# Patient Record
Sex: MALE | Race: WHITE | NOT HISPANIC OR LATINO | Employment: FULL TIME | URBAN - METROPOLITAN AREA
[De-identification: names, ages, dates, MRNs, and addresses within clinical notes are randomized per-mention and may not be internally consistent; named-entity substitution may affect disease eponyms.]

---

## 2017-02-21 ENCOUNTER — OFFICE VISIT (OUTPATIENT)
Dept: SURGERY | Facility: CLINIC | Age: 32
End: 2017-02-21

## 2017-02-21 VITALS — BODY MASS INDEX: 35.7 KG/M2 | HEART RATE: 82 BPM | OXYGEN SATURATION: 97 % | WEIGHT: 241 LBS | HEIGHT: 69 IN

## 2017-02-21 DIAGNOSIS — L05.91 PILONIDAL CYST: Primary | ICD-10-CM

## 2017-02-21 PROCEDURE — 99203 OFFICE O/P NEW LOW 30 MIN: CPT | Performed by: SURGERY

## 2017-02-21 RX ORDER — LEVOFLOXACIN 5 MG/ML
500 INJECTION, SOLUTION INTRAVENOUS ONCE
Status: CANCELLED | OUTPATIENT
Start: 2017-02-21 | End: 2017-02-21

## 2017-02-21 RX ORDER — IBUPROFEN 200 MG
200 TABLET ORAL EVERY 6 HOURS PRN
COMMUNITY
End: 2017-03-14

## 2017-02-21 NOTE — PROGRESS NOTES
General Surgery  Initial Office Visit    CC: Infected cyst over tailbone    HPI: The patient is a pleasant 31 y.o. year-old gentleman who presents today for evaluation of a chronic cyst over his lower back at the superior aspect of his gluteal crease.  He has noticed a small cyst there for the last 4-5 years, and states that approximately 2 weeks ago it became very swollen and erythematous.  He sensed a lot of pressure within soft tissues and presented to [facility] where an incision and drainage was performed.  The day following his incision and drainage, a large amount of purulent fluid evacuated from the cavity after he soaked in the bathtub for many hours.  The drainage has since subsided.  He was prescribed Bactrim for 2 weeks, and just completed a full course of antibiotics yesterday.  He states that before this acute infected episode occurred 2 weeks ago, he has noticed chronic daily drainage that occurs at night.  His mother apparently also has a pilonidal cyst with intermittent episodes of cyst infection.    Past Medical History: None    Past Surgical History: Hatch teeth removal, shoulder abscess incision and drainage    Medications: Bactrim DS twice a day ×14 days completed yesterday    Allergies: Keflex (upset stomach)    Family History: Mother with pilonidal cyst    Social History: , smokes 1/2ppd for 12 years, social alcohol use,     ROS:   Constitutional: Negative for fevers or chills  HENT: Negative for hearing loss or runny nose  Eyes: Negative for vision changes or scleral icterus  Respiratory: Negative for cough or shortness of breath  Cardiovascular: Negative for chest pain or heart palpitations  Gastrointestinal: Negative for abdominal pain, nausea, vomiting, constipation, melena, or hematochezia  Genitourinary: Negative for hematuria or dysuria  Musculoskeletal: Negative for joint pain or back pain  Neurologic: Negative for headaches or dizziness  Psychiatric:  Negative for anxiety or depression  All other systems reviewed and negative    Physical Exam:  Vitals:    02/21/17 0830   Pulse: 82   SpO2: 97%     General: No acute distress, well-nourished & well-developed  HEAD: normocephalic, atraumatic  EYES: normal conjunctiva, sclera anicteric  EARS: grossly normal hearing  NECK: supple, no thyromegaly  CARDIOVASCULAR: regular rate and rhythm  RESPIRATORY: clear to auscultation bilaterally  GASTROINTESTINAL: soft, nontender, non-distended  MUSCULOSKELETAL: normal gait and station. No gross extremity abnormalities  BACK: small palpable cyst at superior aspect of gluteal crease with multiple pores identified in lower gluteal crease over sacrum, minimal erythema, no fluctuance or drainage  PSYCHIATRIC: oriented x3, normal mood and affect      ASSESSMENT & PLAN  Mr. Gonzalez is a 31-year-old gentleman with a chronic pilonidal cyst and recent infection that has since resolved with drainage and antibiotic therapy.  He wishes to have his pilonidal cyst electively removed and I have offered pilonidal cystectomy at his earliest convenience.  We did discuss the risks of the procedure including bleeding, wound infection, and wound dehiscence.  He understands that possible wound dehiscence is avoidable if he can avoid any bending at the waist for about 4 weeks postoperatively.  He understands these risks, and has consented to proceed.  All questions were answered today.    Selina Hernandez MD  General and Endoscopic Surgery  [facility]    [address], [address]  [place], [place], [address]  P: [phone]  F: [phone]

## 2017-03-15 ENCOUNTER — HOSPITAL ENCOUNTER (OUTPATIENT)
Facility: HOSPITAL | Age: 32
Setting detail: HOSPITAL OUTPATIENT SURGERY
Discharge: HOME | End: 2017-03-15
Attending: SURGERY | Admitting: SURGERY

## 2017-03-15 ENCOUNTER — ANESTHESIA (OUTPATIENT)
Dept: PERIOP | Facility: HOSPITAL | Age: 32
End: 2017-03-15

## 2017-03-15 ENCOUNTER — ANESTHESIA EVENT (OUTPATIENT)
Dept: PERIOP | Facility: HOSPITAL | Age: 32
End: 2017-03-15

## 2017-03-15 VITALS
RESPIRATION RATE: 16 BRPM | BODY MASS INDEX: 34.7 KG/M2 | TEMPERATURE: 98.6 F | DIASTOLIC BLOOD PRESSURE: 77 MMHG | OXYGEN SATURATION: 94 % | WEIGHT: 242.38 LBS | HEART RATE: 95 BPM | HEIGHT: 70 IN | SYSTOLIC BLOOD PRESSURE: 120 MMHG

## 2017-03-15 DIAGNOSIS — L05.91 PILONIDAL CYST: ICD-10-CM

## 2017-03-15 LAB
DEPRECATED RDW RBC AUTO: 39.8 FL (ref 37–54)
ERYTHROCYTE [DISTWIDTH] IN BLOOD BY AUTOMATED COUNT: 12.3 % (ref 11.5–14.5)
HCT VFR BLD AUTO: 46.4 % (ref 40.4–52.2)
HGB BLD-MCNC: 16.1 G/DL (ref 13.7–17.6)
MCH RBC QN AUTO: 31 PG (ref 27–32.7)
MCHC RBC AUTO-ENTMCNC: 34.7 G/DL (ref 32.6–36.4)
MCV RBC AUTO: 89.2 FL (ref 79.8–96.2)
PLATELET # BLD AUTO: 260 10*3/MM3 (ref 140–500)
PMV BLD AUTO: 9.8 FL (ref 6–12)
RBC # BLD AUTO: 5.2 10*6/MM3 (ref 4.6–6)
WBC NRBC COR # BLD: 7.5 10*3/MM3 (ref 4.5–10.7)

## 2017-03-15 PROCEDURE — 25010000002 PROPOFOL 10 MG/ML EMULSION: Performed by: ANESTHESIOLOGY

## 2017-03-15 PROCEDURE — 25010000002 ONDANSETRON PER 1 MG: Performed by: ANESTHESIOLOGY

## 2017-03-15 PROCEDURE — 25010000002 NEOSTIGMINE 10 MG/10ML SOLUTION: Performed by: ANESTHESIOLOGY

## 2017-03-15 PROCEDURE — 25010000002 MIDAZOLAM PER 1 MG

## 2017-03-15 PROCEDURE — 11771 EXC PILONIDAL CYST XTNSV: CPT | Performed by: SURGERY

## 2017-03-15 PROCEDURE — 85027 COMPLETE CBC AUTOMATED: CPT | Performed by: SURGERY

## 2017-03-15 PROCEDURE — 25010000002 FENTANYL CITRATE (PF) 100 MCG/2ML SOLUTION: Performed by: ANESTHESIOLOGY

## 2017-03-15 PROCEDURE — 88304 TISSUE EXAM BY PATHOLOGIST: CPT | Performed by: SURGERY

## 2017-03-15 PROCEDURE — 25010000002 LEVOFLOXACIN PER 250 MG: Performed by: SURGERY

## 2017-03-15 RX ORDER — SODIUM CHLORIDE, SODIUM LACTATE, POTASSIUM CHLORIDE, CALCIUM CHLORIDE 600; 310; 30; 20 MG/100ML; MG/100ML; MG/100ML; MG/100ML
9 INJECTION, SOLUTION INTRAVENOUS CONTINUOUS
Status: DISCONTINUED | OUTPATIENT
Start: 2017-03-15 | End: 2017-03-15 | Stop reason: HOSPADM

## 2017-03-15 RX ORDER — FLUMAZENIL 0.1 MG/ML
0.2 INJECTION INTRAVENOUS AS NEEDED
Status: DISCONTINUED | OUTPATIENT
Start: 2017-03-15 | End: 2017-03-15 | Stop reason: HOSPADM

## 2017-03-15 RX ORDER — MIDAZOLAM HYDROCHLORIDE 1 MG/ML
INJECTION INTRAMUSCULAR; INTRAVENOUS
Status: COMPLETED
Start: 2017-03-15 | End: 2017-03-15

## 2017-03-15 RX ORDER — DIPHENHYDRAMINE HYDROCHLORIDE 50 MG/ML
12.5 INJECTION INTRAMUSCULAR; INTRAVENOUS
Status: DISCONTINUED | OUTPATIENT
Start: 2017-03-15 | End: 2017-03-15 | Stop reason: HOSPADM

## 2017-03-15 RX ORDER — HYDROMORPHONE HYDROCHLORIDE 1 MG/ML
0.5 INJECTION, SOLUTION INTRAMUSCULAR; INTRAVENOUS; SUBCUTANEOUS
Status: DISCONTINUED | OUTPATIENT
Start: 2017-03-15 | End: 2017-03-15 | Stop reason: HOSPADM

## 2017-03-15 RX ORDER — FAMOTIDINE 10 MG/ML
INJECTION, SOLUTION INTRAVENOUS
Status: COMPLETED
Start: 2017-03-15 | End: 2017-03-15

## 2017-03-15 RX ORDER — LEVOFLOXACIN 5 MG/ML
500 INJECTION, SOLUTION INTRAVENOUS ONCE
Status: COMPLETED | OUTPATIENT
Start: 2017-03-15 | End: 2017-03-15

## 2017-03-15 RX ORDER — OXYCODONE AND ACETAMINOPHEN 7.5; 325 MG/1; MG/1
1 TABLET ORAL ONCE AS NEEDED
Status: DISCONTINUED | OUTPATIENT
Start: 2017-03-15 | End: 2017-03-15 | Stop reason: HOSPADM

## 2017-03-15 RX ORDER — HYDROCODONE BITARTRATE AND ACETAMINOPHEN 7.5; 325 MG/1; MG/1
1 TABLET ORAL ONCE AS NEEDED
Status: COMPLETED | OUTPATIENT
Start: 2017-03-15 | End: 2017-03-15

## 2017-03-15 RX ORDER — LIDOCAINE HYDROCHLORIDE 20 MG/ML
INJECTION, SOLUTION INFILTRATION; PERINEURAL AS NEEDED
Status: DISCONTINUED | OUTPATIENT
Start: 2017-03-15 | End: 2017-03-15 | Stop reason: SURG

## 2017-03-15 RX ORDER — IBUPROFEN 400 MG/1
400 TABLET ORAL EVERY 8 HOURS PRN
COMMUNITY

## 2017-03-15 RX ORDER — HYDRALAZINE HYDROCHLORIDE 20 MG/ML
5 INJECTION INTRAMUSCULAR; INTRAVENOUS
Status: DISCONTINUED | OUTPATIENT
Start: 2017-03-15 | End: 2017-03-15 | Stop reason: HOSPADM

## 2017-03-15 RX ORDER — ONDANSETRON 2 MG/ML
INJECTION INTRAMUSCULAR; INTRAVENOUS AS NEEDED
Status: DISCONTINUED | OUTPATIENT
Start: 2017-03-15 | End: 2017-03-15 | Stop reason: SURG

## 2017-03-15 RX ORDER — LABETALOL HYDROCHLORIDE 5 MG/ML
5 INJECTION, SOLUTION INTRAVENOUS
Status: DISCONTINUED | OUTPATIENT
Start: 2017-03-15 | End: 2017-03-15 | Stop reason: HOSPADM

## 2017-03-15 RX ORDER — FENTANYL CITRATE 50 UG/ML
INJECTION, SOLUTION INTRAMUSCULAR; INTRAVENOUS AS NEEDED
Status: DISCONTINUED | OUTPATIENT
Start: 2017-03-15 | End: 2017-03-15 | Stop reason: SURG

## 2017-03-15 RX ORDER — LIDOCAINE HYDROCHLORIDE 10 MG/ML
1 INJECTION, SOLUTION EPIDURAL; INFILTRATION; INTRACAUDAL; PERINEURAL ONCE
Status: DISCONTINUED | OUTPATIENT
Start: 2017-03-15 | End: 2017-03-15 | Stop reason: HOSPADM

## 2017-03-15 RX ORDER — PROMETHAZINE HYDROCHLORIDE 25 MG/1
25 TABLET ORAL ONCE AS NEEDED
Status: DISCONTINUED | OUTPATIENT
Start: 2017-03-15 | End: 2017-03-15 | Stop reason: HOSPADM

## 2017-03-15 RX ORDER — BUPIVACAINE HYDROCHLORIDE AND EPINEPHRINE 5; 5 MG/ML; UG/ML
INJECTION, SOLUTION PERINEURAL AS NEEDED
Status: DISCONTINUED | OUTPATIENT
Start: 2017-03-15 | End: 2017-03-15 | Stop reason: HOSPADM

## 2017-03-15 RX ORDER — NEOSTIGMINE METHYLSULFATE 1 MG/ML
INJECTION, SOLUTION INTRAVENOUS AS NEEDED
Status: DISCONTINUED | OUTPATIENT
Start: 2017-03-15 | End: 2017-03-15 | Stop reason: SURG

## 2017-03-15 RX ORDER — SODIUM CHLORIDE 0.9 % (FLUSH) 0.9 %
1-10 SYRINGE (ML) INJECTION AS NEEDED
Status: DISCONTINUED | OUTPATIENT
Start: 2017-03-15 | End: 2017-03-15 | Stop reason: HOSPADM

## 2017-03-15 RX ORDER — ROCURONIUM BROMIDE 10 MG/ML
INJECTION, SOLUTION INTRAVENOUS AS NEEDED
Status: DISCONTINUED | OUTPATIENT
Start: 2017-03-15 | End: 2017-03-15 | Stop reason: SURG

## 2017-03-15 RX ORDER — ONDANSETRON 2 MG/ML
4 INJECTION INTRAMUSCULAR; INTRAVENOUS ONCE AS NEEDED
Status: COMPLETED | OUTPATIENT
Start: 2017-03-15 | End: 2017-03-15

## 2017-03-15 RX ORDER — PROMETHAZINE HYDROCHLORIDE 25 MG/1
12.5 TABLET ORAL ONCE AS NEEDED
Status: DISCONTINUED | OUTPATIENT
Start: 2017-03-15 | End: 2017-03-15 | Stop reason: HOSPADM

## 2017-03-15 RX ORDER — HYDROCODONE BITARTRATE AND ACETAMINOPHEN 7.5; 325 MG/1; MG/1
TABLET ORAL
Status: COMPLETED
Start: 2017-03-15 | End: 2017-03-15

## 2017-03-15 RX ORDER — NALOXONE HCL 0.4 MG/ML
0.2 VIAL (ML) INJECTION AS NEEDED
Status: DISCONTINUED | OUTPATIENT
Start: 2017-03-15 | End: 2017-03-15 | Stop reason: HOSPADM

## 2017-03-15 RX ORDER — MIDAZOLAM HYDROCHLORIDE 1 MG/ML
2 INJECTION INTRAMUSCULAR; INTRAVENOUS
Status: DISCONTINUED | OUTPATIENT
Start: 2017-03-15 | End: 2017-03-15 | Stop reason: HOSPADM

## 2017-03-15 RX ORDER — PROMETHAZINE HYDROCHLORIDE 25 MG/ML
12.5 INJECTION, SOLUTION INTRAMUSCULAR; INTRAVENOUS ONCE AS NEEDED
Status: DISCONTINUED | OUTPATIENT
Start: 2017-03-15 | End: 2017-03-15 | Stop reason: HOSPADM

## 2017-03-15 RX ORDER — PROPOFOL 10 MG/ML
VIAL (ML) INTRAVENOUS AS NEEDED
Status: DISCONTINUED | OUTPATIENT
Start: 2017-03-15 | End: 2017-03-15 | Stop reason: SURG

## 2017-03-15 RX ORDER — PROMETHAZINE HYDROCHLORIDE 25 MG/1
25 SUPPOSITORY RECTAL ONCE AS NEEDED
Status: DISCONTINUED | OUTPATIENT
Start: 2017-03-15 | End: 2017-03-15 | Stop reason: HOSPADM

## 2017-03-15 RX ORDER — ONDANSETRON 4 MG/1
4 TABLET, FILM COATED ORAL EVERY 6 HOURS PRN
Qty: 30 TABLET | Refills: 1 | Status: SHIPPED | OUTPATIENT
Start: 2017-03-15 | End: 2017-03-30

## 2017-03-15 RX ORDER — OXYCODONE AND ACETAMINOPHEN 10; 325 MG/1; MG/1
1 TABLET ORAL EVERY 4 HOURS PRN
Qty: 50 TABLET | Refills: 0 | Status: SHIPPED | OUTPATIENT
Start: 2017-03-15 | End: 2017-03-30

## 2017-03-15 RX ORDER — FAMOTIDINE 10 MG/ML
20 INJECTION, SOLUTION INTRAVENOUS ONCE
Status: COMPLETED | OUTPATIENT
Start: 2017-03-15 | End: 2017-03-15

## 2017-03-15 RX ORDER — FENTANYL CITRATE 50 UG/ML
50 INJECTION, SOLUTION INTRAMUSCULAR; INTRAVENOUS
Status: DISCONTINUED | OUTPATIENT
Start: 2017-03-15 | End: 2017-03-15 | Stop reason: HOSPADM

## 2017-03-15 RX ORDER — MIDAZOLAM HYDROCHLORIDE 1 MG/ML
1 INJECTION INTRAMUSCULAR; INTRAVENOUS
Status: DISCONTINUED | OUTPATIENT
Start: 2017-03-15 | End: 2017-03-15 | Stop reason: HOSPADM

## 2017-03-15 RX ORDER — GLYCOPYRROLATE 0.2 MG/ML
INJECTION INTRAMUSCULAR; INTRAVENOUS AS NEEDED
Status: DISCONTINUED | OUTPATIENT
Start: 2017-03-15 | End: 2017-03-15 | Stop reason: SURG

## 2017-03-15 RX ADMIN — LIDOCAINE HYDROCHLORIDE 60 MG: 20 INJECTION, SOLUTION INFILTRATION; PERINEURAL at 10:28

## 2017-03-15 RX ADMIN — GLYCOPYRROLATE 0.2 MG: 0.2 INJECTION INTRAMUSCULAR; INTRAVENOUS at 10:55

## 2017-03-15 RX ADMIN — FENTANYL CITRATE 100 MCG: 50 INJECTION, SOLUTION INTRAMUSCULAR; INTRAVENOUS at 10:28

## 2017-03-15 RX ADMIN — SODIUM CHLORIDE, POTASSIUM CHLORIDE, SODIUM LACTATE AND CALCIUM CHLORIDE 9 ML/HR: 600; 310; 30; 20 INJECTION, SOLUTION INTRAVENOUS at 09:30

## 2017-03-15 RX ADMIN — PROPOFOL 200 MG: 10 INJECTION, EMULSION INTRAVENOUS at 10:28

## 2017-03-15 RX ADMIN — HYDROCODONE BITARTRATE AND ACETAMINOPHEN 1 TABLET: 7.5; 325 TABLET ORAL at 12:01

## 2017-03-15 RX ADMIN — MIDAZOLAM 2 MG: 1 INJECTION INTRAMUSCULAR; INTRAVENOUS at 09:30

## 2017-03-15 RX ADMIN — FAMOTIDINE 20 MG: 10 INJECTION, SOLUTION INTRAVENOUS at 09:29

## 2017-03-15 RX ADMIN — FENTANYL CITRATE 50 MCG: 50 INJECTION, SOLUTION INTRAMUSCULAR; INTRAVENOUS at 11:00

## 2017-03-15 RX ADMIN — NEOSTIGMINE METHYLSULFATE 2.5 MG: 1 INJECTION INTRAVENOUS at 11:22

## 2017-03-15 RX ADMIN — ROCURONIUM BROMIDE 30 MG: 10 INJECTION INTRAVENOUS at 10:28

## 2017-03-15 RX ADMIN — GLYCOPYRROLATE 0.5 MG: 0.2 INJECTION INTRAMUSCULAR; INTRAVENOUS at 11:22

## 2017-03-15 RX ADMIN — FENTANYL CITRATE 100 MCG: 50 INJECTION, SOLUTION INTRAMUSCULAR; INTRAVENOUS at 10:55

## 2017-03-15 RX ADMIN — MIDAZOLAM HYDROCHLORIDE 2 MG: 1 INJECTION INTRAMUSCULAR; INTRAVENOUS at 09:30

## 2017-03-15 RX ADMIN — LEVOFLOXACIN 500 MG: 500 INJECTION, SOLUTION INTRAVENOUS at 09:30

## 2017-03-15 RX ADMIN — ONDANSETRON 4 MG: 2 INJECTION INTRAMUSCULAR; INTRAVENOUS at 12:14

## 2017-03-15 RX ADMIN — ONDANSETRON 4 MG: 2 INJECTION INTRAMUSCULAR; INTRAVENOUS at 10:55

## 2017-03-15 NOTE — OP NOTE
Operative Note :  Selina Hernandez MD      Bruce Kilpatrickpton  1985    Procedure Date: 03/15/17    Pre-op Diagnosis:  · Pilonidal cyst    Post-op Diagnosis:  · Pilonidal cyst    Procedure:   · Pilonidal cystectomy with primary closure    Surgeon: Selina Hernandez MD    Assistant: Alisha WORTHINGTON    Anesthesia:  General (general endotracheal tube)    Estimated Blood Loss: 10 cc    Specimens: Pilonidal cyst    Complications: None    Indications:  · The patient is a 32-year-old gentleman who has had a chronic pilonidal cyst for many months now that has persistently become infected requiring incision and drainage.  He presents today for elective pilonidal cystectomy and understands the risks of the procedure to include bleeding, wound infection, postoperative seroma formation, and possible wound dehiscence.  Despite these risks, he has consented to proceed.    Findings:   · Moderate sized pilonidal cyst at the superior gluteal cleft with multiple pores inferior to this requiring approximately 15 cm length pilonidal cystectomy with primary closure    Description of procedure:  The patient was brought to the operating room and placed on the OR table in supine position.  An endotracheal tube was inserted, and general anesthesia was induced.  He was then repositioned prone with all flexor points and pressure points padded with pillows and foam.  His backside was then shaved, prepped, and draped in a sterile fashion with his gluteal soft tissue retracted laterally using tape.  A surgical timeout was then completed.  His pilonidal cyst was immediately evident at the superior aspect of his gluteal cleft with multiple small pores inferior to this extending down over the region of the sacrum and coccyx.  This area was anesthetized with 30 cc of half percent Marcaine with epinephrine and excised in an elliptical fashion beginning with the skin and soft tissues and neck extending all the way down to the sacral periosteum.   The pilonidal cyst itself was never entered, and was completely excised without any signs of purulence.  The specimen was passed off the field to pathology in formalin.  The remaining wound was irrigated and hemostasis achieved with Bovie electrocautery.  The cavity was then closed in 2 layers using interrupted 3-0 Vicryl dermal sutures and interrupted 2-0 nylon vertical mattress sutures.  4 x 4 fluffs, an ABD pad, and mesh panties were then applied.  The patient was then returned to supine positioning, extubated, and transferred to PACU in stable condition.  All counts were correct per nursing.    Selina Hernandez MD  General and Endoscopic Surgery  [facility]    [address], [address]  [place], [place], [address]  P: [phone]  F: [phone]

## 2017-03-15 NOTE — PLAN OF CARE
Problem: Patient Care Overview (Adult)  Goal: Plan of Care Review  Outcome: Ongoing (interventions implemented as appropriate)    03/15/17 0912   Coping/Psychosocial Response Interventions   Plan Of Care Reviewed With patient   Patient Care Overview   Progress no change       Goal: Adult Individualization and Mutuality  Outcome: Ongoing (interventions implemented as appropriate)    03/15/17 0912   Individualization   Patient Specific Preferences nervous about surgery

## 2017-03-15 NOTE — ANESTHESIA PROCEDURE NOTES
Airway  Urgency: elective    Airway not difficult    General Information and Staff    Patient location during procedure: OR  Anesthesiologist: TEREZA VENTURA    Indications and Patient Condition  Indications for airway management: airway protection    Preoxygenated: yes  Mask difficulty assessment: 1 - vent by mask    Final Airway Details  Final airway type: endotracheal airway      Successful airway: ETT  Cuffed: yes   Successful intubation technique: video laryngoscopy  Endotracheal tube insertion site: oral  Blade: Kamryn  Blade size: #3  ETT size: 7.0 mm  Cormack-Lehane Classification: grade IIb - view of arytenoids or posterior of glottis only  Placement verified by: chest auscultation and capnometry   Number of attempts at approach: 3 or more

## 2017-03-15 NOTE — PLAN OF CARE
Problem: Patient Care Overview (Adult)  Goal: Plan of Care Review  Outcome: Outcome(s) achieved Date Met:  03/15/17    03/15/17 1351   Coping/Psychosocial Response Interventions   Plan Of Care Reviewed With patient;spouse   Patient Care Overview   Progress progress toward functional goals as expected   Outcome Evaluation   Outcome Summary/Follow up Plan ready for discharge       Goal: Adult Individualization and Mutuality  Outcome: Outcome(s) achieved Date Met:  03/15/17  Goal: Discharge Needs Assessment  Outcome: Outcome(s) achieved Date Met:  03/15/17    Problem: Perioperative Period (Adult)  Goal: Signs and Symptoms of Listed Potential Problems Will be Absent or Manageable (Perioperative Period)  Outcome: Outcome(s) achieved Date Met:  03/15/17    03/15/17 1351   Perioperative Period   Problems Assessed (Perioperative Period) all   Problems Present (Perioperative Period) none

## 2017-03-15 NOTE — INTERVAL H&P NOTE
H&P reviewed. The patient was examined and there are no changes to the H&P. Plan for pilonidal cystectomy today under general anesthesia. He understands the risks of the procedure and has consented to proceed.    Selina Hernandez MD  General and Endoscopic Surgery  [facility]    [address], [address]  [place], [place], [address]  P: [phone]  F: [phone]

## 2017-03-15 NOTE — H&P (VIEW-ONLY)
General Surgery  Initial Office Visit    CC: Infected cyst over tailbone    HPI: The patient is a pleasant 31 y.o. year-old gentleman who presents today for evaluation of a chronic cyst over his lower back at the superior aspect of his gluteal crease.  He has noticed a small cyst there for the last 4-5 years, and states that approximately 2 weeks ago it became very swollen and erythematous.  He sensed a lot of pressure within soft tissues and presented to [facility] where an incision and drainage was performed.  The day following his incision and drainage, a large amount of purulent fluid evacuated from the cavity after he soaked in the bathtub for many hours.  The drainage has since subsided.  He was prescribed Bactrim for 2 weeks, and just completed a full course of antibiotics yesterday.  He states that before this acute infected episode occurred 2 weeks ago, he has noticed chronic daily drainage that occurs at night.  His mother apparently also has a pilonidal cyst with intermittent episodes of cyst infection.    Past Medical History: None    Past Surgical History: Mason teeth removal, shoulder abscess incision and drainage    Medications: Bactrim DS twice a day ×14 days completed yesterday    Allergies: Keflex (upset stomach)    Family History: Mother with pilonidal cyst    Social History: , smokes 1/2ppd for 12 years, social alcohol use,     ROS:   Constitutional: Negative for fevers or chills  HENT: Negative for hearing loss or runny nose  Eyes: Negative for vision changes or scleral icterus  Respiratory: Negative for cough or shortness of breath  Cardiovascular: Negative for chest pain or heart palpitations  Gastrointestinal: Negative for abdominal pain, nausea, vomiting, constipation, melena, or hematochezia  Genitourinary: Negative for hematuria or dysuria  Musculoskeletal: Negative for joint pain or back pain  Neurologic: Negative for headaches or dizziness  Psychiatric:  Negative for anxiety or depression  All other systems reviewed and negative    Physical Exam:  Vitals:    02/21/17 0830   Pulse: 82   SpO2: 97%     General: No acute distress, well-nourished & well-developed  HEAD: normocephalic, atraumatic  EYES: normal conjunctiva, sclera anicteric  EARS: grossly normal hearing  NECK: supple, no thyromegaly  CARDIOVASCULAR: regular rate and rhythm  RESPIRATORY: clear to auscultation bilaterally  GASTROINTESTINAL: soft, nontender, non-distended  MUSCULOSKELETAL: normal gait and station. No gross extremity abnormalities  BACK: small palpable cyst at superior aspect of gluteal crease with multiple pores identified in lower gluteal crease over sacrum, minimal erythema, no fluctuance or drainage  PSYCHIATRIC: oriented x3, normal mood and affect      ASSESSMENT & PLAN  Mr. Gonzalez is a 31-year-old gentleman with a chronic pilonidal cyst and recent infection that has since resolved with drainage and antibiotic therapy.  He wishes to have his pilonidal cyst electively removed and I have offered pilonidal cystectomy at his earliest convenience.  We did discuss the risks of the procedure including bleeding, wound infection, and wound dehiscence.  He understands that possible wound dehiscence is avoidable if he can avoid any bending at the waist for about 4 weeks postoperatively.  He understands these risks, and has consented to proceed.  All questions were answered today.    Selina Hernandez MD  General and Endoscopic Surgery  [facility] [place]    [address], [address]  [place], [place], [address]  P: [phone]  F: [phone]

## 2017-03-15 NOTE — ANESTHESIA PREPROCEDURE EVALUATION
Anesthesia Evaluation     Patient summary reviewed   history of anesthetic complications:     Airway   Mallampati: II  TM distance: >3 FB  no difficulty expected  Dental - normal exam     Pulmonary - normal exam   (+) sleep apnea,   Cardiovascular     ECG reviewed        Neuro/Psych  GI/Hepatic/Renal/Endo    (+) obesity,      Musculoskeletal     Abdominal    Substance History      OB/GYN          Other                                  Anesthesia Plan    ASA 2     general     intravenous induction   Anesthetic plan and risks discussed with patient and spouse/significant other.

## 2017-03-16 LAB
CYTO UR: NORMAL
LAB AP CASE REPORT: NORMAL
Lab: NORMAL
PATH REPORT.FINAL DX SPEC: NORMAL
PATH REPORT.GROSS SPEC: NORMAL

## 2017-03-22 ENCOUNTER — OFFICE VISIT (OUTPATIENT)
Dept: SURGERY | Facility: CLINIC | Age: 32
End: 2017-03-22

## 2017-03-22 DIAGNOSIS — L05.91 PILONIDAL CYST: Primary | ICD-10-CM

## 2017-03-22 PROCEDURE — 99024 POSTOP FOLLOW-UP VISIT: CPT | Performed by: SURGERY

## 2017-03-22 NOTE — PROGRESS NOTES
CHIEF COMPLAINT:   Chief Complaint   Patient presents with   • Post-op     Pilonidal cystectomy with primary closure 3/15/17       HISTORY OF PRESENT ILLNESS:  This is a 32 y.o. male who presents for a post-operative visit after undergoing a pilonidal cystectomy one week ago on 3/15/2017. He returns to see me today because his wife was concerned that one of the most inferior stitches may have pulled through the skin and she called my office this afternoon immediately upon noticing.    Pathology:   SKIN, EXCISIONAL SPECIMEN:  INFLAMED CYST, CONSISTENT WITH PILONIDAL TYPE, WITH EVIDENCE OF EARLY ABSCESS  FORMATION.     PHYSICAL EXAM:  Lungs: Clear  Heart: RRR  ABD: soft, nontender  BACK: superior gluteal crease with longitudinal incision clean/dry/intact with nylon sutures, no erythema or fluctuance  Ext: no significant edema, calves nontender    A/P:  This is a 32 y.o. male patient s/p pilonidal cystectomy on 3/15/2017    His incision looks fantastic with no erythema or drainage.  The superior two thirds of the incision seems to be healing very nicely with skin that has already fused.  The lower one third of the skin incision seems to be slightly slower at healing although I see no signs of infection currently. All of his nylon sutures are intact. I tightened a few of the inferior stitches where the top knots were loose.  I reassured the patient and his wife that they're doing fantastic job caring for the incision and should continue to wash it with soap and water daily.  There are no signs of infection that would warrant any antibiotics at this time and I see no sutures that had pulled through the skin.  He can follow-up with me in 1 week's time at his previously scheduled appointment and knows to continue to avoid bending at the waist as much as possible.    Selina Hernandez MD  General and Endoscopic Surgery  [facility]    [address], [address]  [place], [place], [address]  P: [phone]  F:  [phone]

## 2017-03-30 ENCOUNTER — OFFICE VISIT (OUTPATIENT)
Dept: SURGERY | Facility: CLINIC | Age: 32
End: 2017-03-30

## 2017-03-30 DIAGNOSIS — L05.91 PILONIDAL CYST: Primary | ICD-10-CM

## 2017-03-30 PROCEDURE — 99024 POSTOP FOLLOW-UP VISIT: CPT | Performed by: SURGERY

## 2017-03-30 RX ORDER — ACETAMINOPHEN 500 MG
500 TABLET ORAL EVERY 6 HOURS PRN
COMMUNITY

## 2017-03-30 NOTE — PROGRESS NOTES
CHIEF COMPLAINT:   Chief Complaint   Patient presents with   • Post-op     Pilonidal cystectomy with primary closure on 03/15/2017       HISTORY OF PRESENT ILLNESS:  This is a 32 y.o. male who presents for a post-operative visit after undergoing a pilonidal cystectomy on 3/15/2017. He returns to see me today after I saw him last week for concern about a possible suture pulling out.  When I examined him last week, all of his sutures were intact however the lower one half of his incision was slightly slower to heal and the skin and had not completely fused yet.  On today's exam, the lower one half of the incision has slowly healed but the skin edges are not completely fused.  The superior one half of the incision looks fantastic with the skin edges completely healed.  There is no erythema or expressible drainage on exam today.    Pathology:   SKIN, EXCISIONAL SPECIMEN:  INFLAMED CYST, CONSISTENT WITH PILONIDAL TYPE, WITH EVIDENCE OF EARLY ABSCESS  FORMATION.     PHYSICAL EXAM:  Lungs: Clear  Heart: RRR  ABD: soft, nontender  BACK: superior gluteal crease with longitudinal incision clean/dry/intact with nylon sutures, no erythema or fluctuance  Ext: no significant edema, calves nontender    A/P:  This is a 32 y.o. male patient s/p pilonidal cystectomy on 3/15/2017    I removed the nylon sutures from the superior one half of his incision and left the lower sutures intact given that the lower one half of the incision has been slightly slower to heal.  He has returned to work full-time, and I advised him that he can begin bending and twisting at the waist and he should contact me immediately if he feels as though any of his skin incision pops open at any point now that the suture has been removed from the upper one half of the incision.  I will see him in 2 weeks and we'll hopefully be able to remove the remaining sutures within the lower half of the incision.  I see no signs of a wound infection, and therefore he does  not require any antibiotics at this time.  The drainage that he has experienced from the lower half of the incision is likely a normal postoperative seroma as he describes it as being clear yellow or clear orange in consistency.  I reassured him and his wife that this is a normal variant following his surgery.    Selina Hernandez MD  General and Endoscopic Surgery  [facility]    [address], [address]  [place], [place], [address]  P: [phone]  F: [phone]

## 2017-04-13 ENCOUNTER — OFFICE VISIT (OUTPATIENT)
Dept: SURGERY | Facility: CLINIC | Age: 32
End: 2017-04-13

## 2017-04-13 DIAGNOSIS — L05.91 PILONIDAL CYST: Primary | ICD-10-CM

## 2017-04-13 PROCEDURE — 99024 POSTOP FOLLOW-UP VISIT: CPT | Performed by: SURGERY

## 2017-04-14 NOTE — PROGRESS NOTES
CHIEF COMPLAINT:   Chief Complaint   Patient presents with   • Post-op     Pilonidal cystectomy with primary closure on 03/15/2017       HISTORY OF PRESENT ILLNESS:  This is a 32 y.o. male who presents for a post-operative visit after undergoing a pilonidal cystectomy on 3/15/2017. He returns to see me again today  And has had no issues other than some scant serous drainage from the lower half of his incision. The upper half of the incision has had no difficulties after removal of the nylon sutures two weeks ago. He has returned to work full time but has still been avoiding bending or twisting at the waste if possible.    Pathology:   SKIN, EXCISIONAL SPECIMEN:  INFLAMED CYST, CONSISTENT WITH PILONIDAL TYPE, WITH EVIDENCE OF EARLY ABSCESS  FORMATION.     PHYSICAL EXAM:  Lungs: Clear  Heart: RRR  ABD: soft, nontender  BACK: superior gluteal crease with longitudinal incision clean/dry/intact with nylon sutures remaining at the lower half of the incision, no erythema or fluctuance  Ext: no significant edema, calves nontender    A/P:  This is a 32 y.o. male patient s/p pilonidal cystectomy on 3/15/2017    I removed the remaining nylon sutures from the inferior one half of his incision and advised him that the skin has not healed completely here so he may experience some persistent drainage of seroma fluid from the wound at times and should keep a dry gauze inserted here at all times. I cleared him to resume running and playing basketball, and advised him to continue to be careful especially when sitting down on the toilet as this may spread the lower half of the incision apart. He expressed understanding and will be careful now that the sutures are all removed. I would like to see him one more time in 4 weeks to ensure complete healing of the pilonidal incision.    Selina Hernandez MD  General and Endoscopic Surgery  [facility]    [address], [address]  [place], [place], [address]  P: [phone]  F:  [phone]

## 2017-05-12 ENCOUNTER — OFFICE VISIT (OUTPATIENT)
Dept: SURGERY | Facility: CLINIC | Age: 32
End: 2017-05-12

## 2017-05-12 DIAGNOSIS — L05.91 PILONIDAL CYST: Primary | ICD-10-CM

## 2017-05-12 PROCEDURE — 99024 POSTOP FOLLOW-UP VISIT: CPT | Performed by: SURGERY

## 2024-07-24 ENCOUNTER — OFFICE VISIT (OUTPATIENT)
Dept: SLEEP MEDICINE | Facility: HOSPITAL | Age: 39
End: 2024-07-24
Payer: COMMERCIAL

## 2024-07-24 VITALS
DIASTOLIC BLOOD PRESSURE: 99 MMHG | HEIGHT: 70 IN | OXYGEN SATURATION: 98 % | BODY MASS INDEX: 34.07 KG/M2 | WEIGHT: 238 LBS | SYSTOLIC BLOOD PRESSURE: 146 MMHG | HEART RATE: 100 BPM

## 2024-07-24 DIAGNOSIS — R06.81 WITNESSED EPISODE OF APNEA: ICD-10-CM

## 2024-07-24 DIAGNOSIS — R06.83 LOUD SNORING: ICD-10-CM

## 2024-07-24 DIAGNOSIS — G47.10 HYPERSOMNIA: Primary | ICD-10-CM

## 2024-07-24 DIAGNOSIS — E66.9 OBESITY (BMI 30-39.9): ICD-10-CM

## 2024-07-24 PROCEDURE — G0463 HOSPITAL OUTPT CLINIC VISIT: HCPCS

## 2024-07-24 NOTE — PROGRESS NOTES
"[facility]  Telephone: [phone] / Fax: [phone] [place]  Telephone: [phone] / Fax: [phone] Katelyn Mcdonald    Referring Physician: Dr Cruz  PCP: same    Reason for consult:  sleep apnea    Bruce Gonzalez is a 39 y.o.male  was seen in the [facility] today for evaluation of sleep apnea.  Increase in snoring over the years, EDS, witnessed apnea. He reports history of gasping respirations. His weight has been fluctuating. He reports morning headaches. He does not take any medications. He has large tonsils.    SH-former smoker age [age], drinks 36-72 oz caffeine per week, no caffeine.    ROS-+neck pain, +history of wheezing    Bruce Gonzalez  has a past medical history of Pilonidal cyst with abscess.    Current Medications:    Current Outpatient Medications:     acetaminophen (TYLENOL) 500 MG tablet, Take 500 mg by mouth Every 6 (Six) Hours As Needed for Mild Pain (1-3)., Disp: , Rfl:     ibuprofen (ADVIL,MOTRIN) 400 MG tablet, Take 400 mg by mouth Every 8 (Eight) Hours As Needed for Mild Pain (1-3)., Disp: , Rfl:     I have reviewed Past Medical History, Past Surgical History, Medication List, Social History and Family History as entered in Sleep Questionnaire and EPIC.    ESS  8   Vital Signs /99   Pulse 100   Ht 177.8 cm (70\")   Wt 108 kg (238 lb)   SpO2 98%   BMI 34.15 kg/m²  Body mass index is 34.15 kg/m².    General Alert and oriented. No acute distress noted   Pharynx/Throat Class IV  Mallampati airway, large tongue, no evidence of redundant lateral pharyngeal tissue. No oral lesions. No thrush. Moist mucous membranes.   Head Normocephalic. Symmetrical. Atraumatic.    Nose No septal deviation. No drainage   Chest Wall Normal shape. Symmetric expansion with respiration. No tenderness.   Neck Trachea midline, no thyromegaly or adenopathy    Lungs Clear to auscultation bilaterally. No wheezes. No rhonchi. No rales. Respirations regular, even and " unlabored.   Heart Regular rhythm and normal rate. Normal S1 and S2. No murmur   Abdomen Soft, non-tender and non-distended. Normal bowel sounds. No masses.   Extremities Moves all extremities well. No edema   Psychiatric Normal mood and affect.        Impression:  1. Hypersomnia    2. Loud snoring    3. Witnessed episode of apnea    4. Obesity (BMI 30-39.9)          Plan:  I discussed the pathophysiology of obstructive sleep apnea with the patient.  We discussed the adverse outcomes associated with untreated sleep-disordered breathing.  We discussed treatment modalities of obstructive sleep apnea including CPAP device.     Sleep study will be scheduled to establish a definitive diagnosis of sleep disorder breathing.  Weight loss will be strongly beneficial in order to reduce the severity of sleep-disordered breathing.      Patient has narrow oropharyngeal structure.  Caution during activities that require prolonged concentration is strongly advised.  After sleep study results are available, patient will be notified, and appointment will be scheduled to discuss sleep study results and treatment recommendations.      I appreciate the opportunity to participate in this patient's care.      ADAN Mckeon  [facility]  Phone: [phone]      Part of this note may be an electronic transcription/translation of spoken language to printed text using the Dragon Dictation System. Some errors may exist even though the document was edited.

## 2024-08-02 ENCOUNTER — HOSPITAL ENCOUNTER (OUTPATIENT)
Dept: SLEEP MEDICINE | Facility: HOSPITAL | Age: 39
End: 2024-08-02
Payer: COMMERCIAL

## 2024-08-02 DIAGNOSIS — G47.10 HYPERSOMNIA: ICD-10-CM

## 2024-08-02 DIAGNOSIS — R06.81 WITNESSED EPISODE OF APNEA: ICD-10-CM

## 2024-08-02 DIAGNOSIS — R06.83 LOUD SNORING: ICD-10-CM

## 2024-08-02 PROCEDURE — 95806 SLEEP STUDY UNATT&RESP EFFT: CPT

## 2024-08-21 DIAGNOSIS — G47.33 OBSTRUCTIVE SLEEP APNEA, ADULT: Primary | ICD-10-CM

## 2024-08-30 ENCOUNTER — DOCUMENTATION (OUTPATIENT)
Dept: SLEEP MEDICINE | Facility: HOSPITAL | Age: 39
End: 2024-08-30
Payer: COMMERCIAL

## 2024-10-14 ENCOUNTER — DOCUMENTATION (OUTPATIENT)
Dept: SLEEP MEDICINE | Facility: HOSPITAL | Age: 39
End: 2024-10-14
Payer: COMMERCIAL

## 2024-10-14 NOTE — PROGRESS NOTES
10/9/2024 saturation below 89% for 1 minute 30 seconds with overnight oximetry on CPAP.  This is not significant and does not require further adjustment of settings.    Electronically signed by Simón Vegas MD, 10/14/24, 12:40 PM EDT.

## 2024-10-30 ENCOUNTER — OFFICE VISIT (OUTPATIENT)
Dept: SLEEP MEDICINE | Facility: HOSPITAL | Age: 39
End: 2024-10-30
Payer: COMMERCIAL

## 2024-10-30 VITALS
BODY MASS INDEX: 33.93 KG/M2 | SYSTOLIC BLOOD PRESSURE: 143 MMHG | HEART RATE: 92 BPM | DIASTOLIC BLOOD PRESSURE: 91 MMHG | HEIGHT: 70 IN | OXYGEN SATURATION: 97 % | WEIGHT: 237 LBS

## 2024-10-30 DIAGNOSIS — G47.33 OBSTRUCTIVE SLEEP APNEA, ADULT: Primary | ICD-10-CM

## 2024-10-30 DIAGNOSIS — E66.9 OBESITY (BMI 30-39.9): ICD-10-CM

## 2024-10-30 PROCEDURE — G0463 HOSPITAL OUTPT CLINIC VISIT: HCPCS

## 2024-10-30 NOTE — PROGRESS NOTES
"[facility]  Telephone: [phone] / Fax: [phone] [place]  Telephone: [phone] / Fax: [phone] Katelyn Mcdonald    PCP: Provider, No Known    Reason for visit: KIKE f/u    Bruce Gonzalez is a 39 y.o.male  was last seen at [facility] sleep lab in July 2024. We did HST in August 2024 to evaluate sleep disorder breathing. Study showed very severe sleep apnea with AHI 72/hr, lowest desaturation 78%, He spent 94 minutes in the hypoxic range. Auto CPAP 5-20cm H2O was started. He had overnight oximetry after he adjusted to the device and it showed correction of sleep related hypoxia. Bruce is here today to review compliance and response to therapy.    He is doing great on the machine. Mask at times does not seal well and leaks.  He has lines on the face when he wakes up in the morning. Mask pressures are comfortable. Machine controls the snoring and apneas.    SH- no tobacco, 12 alc per week, no caffeine.    ROS+nasal congestion, +cough, rest is negative.    DME [facility]    Current Medications:    Current Outpatient Medications:     acetaminophen (TYLENOL) 500 MG tablet, Take 500 mg by mouth Every 6 (Six) Hours As Needed for Mild Pain (1-3)., Disp: , Rfl:     ibuprofen (ADVIL,MOTRIN) 400 MG tablet, Take 400 mg by mouth Every 8 (Eight) Hours As Needed for Mild Pain (1-3)., Disp: , Rfl:    also entered in Sleep Questionnaire    Patient  has a past medical history of Pilonidal cyst with abscess.    I have reviewed the Past Medical History, Past Surgical History, Social History and Family History.    Vital Signs /91   Pulse 92   Ht 177.8 cm (70\")   Wt 108 kg (237 lb)   SpO2 97%   BMI 34.01 kg/m²  Body mass index is 34.01 kg/m².    General Alert and oriented. No acute distress noted   Pharynx/Throat Class IV  Mallampati airway, large tongue, no evidence of redundant lateral pharyngeal tissue. No oral lesions. No thrush. Moist mucous membranes.   Head Normocephalic. Symmetrical. " Atraumatic.    Nose No septal deviation. No drainage   Chest Wall Normal shape. Symmetric expansion with respiration. No tenderness.   Neck Trachea midline, no thyromegaly or adenopathy    Lungs Clear to auscultation bilaterally. No wheezes. No rhonchi. No rales. Respirations regular, even and unlabored.   Heart Regular rhythm and normal rate. Normal S1 and S2. No murmur   Abdomen Soft, non-tender and non-distended. Normal bowel sounds. No masses.   Extremities Moves all extremities well. No edema   Psychiatric Normal mood and affect.     Testing:  Download last 30 day d/l shows average nightly use of 7 hours and 21 minutes on auto CPAP 5-20cm H2O, avg pressure is 14.7cm H2O, AHI is 3.4/hr, leak is 38 L/min    Study-8/9/24-Diagnostic findings: The patient tolerated the home sleep testing with monitoring time of 427 minutes. The data obtained make this a technically adequate study. The apnea hypopneas index(AHI) was 72.7 per sleep hour.  The AHI during supine position was 82.2 per sleep hour. Mean heart rate of 88.3 BPM.  Snoring was noted 35% of sleep time. Lowest oxygen saturation during the study was 78%. Saturation below 89% was noted for 94.8 mins.     Impression:  1. Obstructive sleep apnea, adult    2. Obesity (BMI 30-39.9)          Plan:  I advised patient to see DME for mask re-fit if seal cannot improve after he changes the mask cushion.    He uses the CPAP device and benefits from its use in terms of reduction of hypersomnia and snoring.  AHI appears to be within adequate range.     I reviewed download report and original sleep study report with the patient. I advised pt to contact us if PAP pressures feel excessive or insufficient.      Follow up with Dr. Vegas in 6 months      Thank you for allowing me to participate in your patient's care.      ADAN Mckeon  [facility]  Phone: [phone]      Part of this note may be an electronic transcription/translation of spoken language to  printed text using the Dragon Dictation System.

## 2024-12-02 NOTE — ANESTHESIA POSTPROCEDURE EVALUATION
Patient: Bruce Gonzalez    Procedure Summary     Date Anesthesia Start Anesthesia Stop Room / Location    03/15/17 1026 1146  [facility] / [facility]       Procedure Diagnosis Surgeon Provider    PILONIDAL CYSTECTOMY (N/A Back) Pilonidal cyst  (Pilonidal cyst [L05.91]) MD Ny Marcano MD          Anesthesia Type: general  Last vitals  /82 (03/15/17 1224)    Temp 37 °C (98.6 °F) (03/15/17 1215)    Pulse 105 (03/15/17 1224)   Resp 16 (03/15/17 1224)    SpO2 97 % (03/15/17 1224)      Post Anesthesia Care and Evaluation    Patient location during evaluation: PHASE II  Patient participation: complete - patient participated  Level of consciousness: awake and alert  Pain score: 1  Pain management: adequate  Airway patency: patent  Anesthetic complications: No anesthetic complications  PONV Status: none  Cardiovascular status: acceptable  Respiratory status: acceptable  Hydration status: acceptable      
Detail Level: Detailed
Size Of Lesion In Cm (Optional): 0

## 2025-04-28 ENCOUNTER — DOCUMENTATION (OUTPATIENT)
Dept: SLEEP MEDICINE | Facility: HOSPITAL | Age: 40
End: 2025-04-28
Payer: COMMERCIAL

## 2025-04-28 ENCOUNTER — OFFICE VISIT (OUTPATIENT)
Dept: SLEEP MEDICINE | Facility: HOSPITAL | Age: 40
End: 2025-04-28
Payer: COMMERCIAL

## 2025-04-28 VITALS
BODY MASS INDEX: 35.22 KG/M2 | SYSTOLIC BLOOD PRESSURE: 144 MMHG | HEIGHT: 70 IN | OXYGEN SATURATION: 99 % | HEART RATE: 82 BPM | DIASTOLIC BLOOD PRESSURE: 79 MMHG | WEIGHT: 246 LBS

## 2025-04-28 DIAGNOSIS — G47.33 OBSTRUCTIVE SLEEP APNEA, ADULT: Primary | ICD-10-CM

## 2025-04-28 DIAGNOSIS — E66.9 OBESITY (BMI 30-39.9): ICD-10-CM

## 2025-04-28 DIAGNOSIS — J35.1 HYPERTROPHY TONSILS: ICD-10-CM

## 2025-04-28 PROCEDURE — G0463 HOSPITAL OUTPT CLINIC VISIT: HCPCS

## 2025-04-28 NOTE — PROGRESS NOTES
"[facility] [place] [facility]  [address]  [place] [address]  [phone]    PCP: Provider, No Known    Reason for visit:  Sleep disorders: KIKE    Bruce is a 40 y.o.male who was seen in the [facility] Center today. He is doing well with CPAP and very beneficial. He wakes up rested and refreshed. Sleeps from 11pm to 6am. Uses ffm. Fits well.  [place] Sleepiness Scale is 5. Caffeine <1 per day. Alcohol 1-4 per week.    Bruce  reports that he has been smoking cigarettes. He has a 6 pack-year smoking history. He does not have any smokeless tobacco history on file.    Pertinent Positive Review of Systems of denies  Rest of Review of Systems was negative as recorded in Sleep Questionnaire.    Patient  has a past medical history of Pilonidal cyst with abscess.     Current Medications:    Current Outpatient Medications:     acetaminophen (TYLENOL) 500 MG tablet, Take 500 mg by mouth Every 6 (Six) Hours As Needed for Mild Pain (1-3)., Disp: , Rfl:     ibuprofen (ADVIL,MOTRIN) 400 MG tablet, Take 400 mg by mouth Every 8 (Eight) Hours As Needed for Mild Pain (1-3)., Disp: , Rfl:    also entered in Sleep Questionnaire         Vital Signs: /79   Pulse 82   Ht 177.8 cm (70\")   Wt 112 kg (246 lb)   SpO2 99%   BMI 35.30 kg/m²     Body mass index is 35.3 kg/m².       Tongue: Normal       Dentition: good       Pharynx: Posterior pharyngeal pillars are narrow; hypertrophied tonsils   Mallampatti: II (hard and soft palate, upper portion of tonsils anduvula visible)        General: Alert. Cooperative. Well developed. No acute distress.             Nose: No septal deviation. No drainage.          Throat: No oral lesions. No thrush. Moist mucous membranes.           Lungs:  Clear to auscultation bilaterally.            Heart:  Regular rhythm and normal rate. No murmur.     Diagnostic data available to date is as below and was reviewed on current visit:  8/9/24: Study-8/9/24-Diagnostic findings: " "The patient tolerated the home sleep testing with monitoring time of 427 minutes. The data obtained make this a technically adequate study. The apnea hypopneas index(AHI) was 72.7 per sleep hour.  The AHI during supine position was 82.2 per sleep hour. Mean heart rate of 88.3 BPM.  Snoring was noted 35% of sleep time. Lowest oxygen saturation during the study was 78%. Saturation below 89% was noted for 94.8 mins.     No results found for: \"IRON\", \"TIBC\", \"FERRITIN\"    Most current available usage data reviewed on 04/28/2025:  No scans are attached to the encounter or orders placed in the encounter.  100% compliance average 6 hours 50 minutes AHI 4.3 average pressure 11 auto CPAP set 5-20    DME Company: [facility]    Prescription to [facility] for replacement supplies as below:    full face mask    Replace head-gear every 3 months.  Replace cushions every 2-4 weeks.     A 4604 Heated Tubing  every 3 mth    A 7037 Standard Tubing  every 3 mth   x A 7035 Headgear  every 3 mth   x A 7046 Repl Humidifier Chamber  every 6 yrs   x A 7038 Disposable Filters  2 per mth   x A 7039 Non-disposable Filter  every 6 mth   x A 7036 Chin Strap  every 6 mth     No orders of the defined types were placed in this encounter.         Impression:  1. Obstructive sleep apnea, adult    2. Obesity (BMI 30-39.9)    3. Hypertrophy tonsils        Plan:  Bruce is doing well, can try different mask. Hypertrophied tonsils and discussed possible benefit of tonsillectomy.  He is not keen to proceed at this time.  I will change his auto CPAP settings to 10-15.  Otherwise doing well and will continue with current use of CPAP.    I reiterated the importance of effective treatment of obstructive sleep apnea with PAP machine.  Cardiovascular health risks of untreated sleep apnea were again reviewed.  Patient was asked to remain cautious if there is persistent hypersomnolence. The benefit of weight loss in reducing severity of obstructive sleep apnea was " discussed.  Patient would benefit from adhering to a strict diet to achieve ideal BMI.     Change of PAP supplies regularly is important for effective use.  Change of cushion on the mask or plugs on nasal pillows along with disposable filters once every month and change of mask frame, tubing, headgear and Velcro straps every 6 months at the minimum was reiterated.    This patient is compliant with PAP machine and benefits from its use.  Apnea hypopneas index is corrected/improved.  Daytime hypersomnolence has resolved.     Patient will follow up in this clinic in 1 year APRN    Thank you for allowing me to participate in your patient's care.    Electronically signed by Simón Vegas MD, 04/28/25, 10:29 AM EDT.    Part of this note may be an electronic transcription/translation of spoken language to printed text using the Dragon Dictation System.

## 2025-04-28 NOTE — PROGRESS NOTES
Pressure changes made in sleep center office per Dr. Vegas in Air[employer]:  Device   [id number] - AirSense 11 AutoSet   View  04/28/2025, 08:09 AM    by Toi Troncoso    Settings sent to device    Request [id number]    Set Mode to AutoSet  Set Min Pressure to 10 cmH2O  Set Max Pressure to 15 cmH2O  Set Response to Standard  Set EPR to Fulltime  Set EPR level to 1  Set Ramp enable to Auto  Set Start pressure to 4.0 cmH2O  Set Climate Control to Auto  Set Humidifier level to Auto  Set Tube temperature to Auto

## (undated) DEVICE — PREP SOL POVIDONE/IODINE BT 4OZ

## (undated) DEVICE — PANTY KNIT MATERN L/XL

## (undated) DEVICE — ANTIBACTERIAL UNDYED BRAIDED (POLYGLACTIN 910), SYNTHETIC ABSORBABLE SUTURE: Brand: COATED VICRYL

## (undated) DEVICE — IRRIGATOR BULB ASEPTO 60CC STRL

## (undated) DEVICE — LOU MINOR PROCEDURE: Brand: MEDLINE INDUSTRIES, INC.

## (undated) DEVICE — NDL HYPO PRECISIONGLIDE REG 25G 1 1/2

## (undated) DEVICE — GLV SURG SENSICARE GREEN W/ALOE PF LF 6.5 STRL

## (undated) DEVICE — SUT ETHLN 2/0 PS 18IN 585H

## (undated) DEVICE — GLV SURG BIOGEL LTX PF 6

## (undated) DEVICE — PAD,ABDOMINAL,8"X10",ST,LF: Brand: MEDLINE

## (undated) DEVICE — SPNG GZ WOVN 4X4IN 12PLY 10/BX STRL